# Patient Record
Sex: FEMALE | Race: ASIAN
[De-identification: names, ages, dates, MRNs, and addresses within clinical notes are randomized per-mention and may not be internally consistent; named-entity substitution may affect disease eponyms.]

---

## 2020-02-26 ENCOUNTER — HOSPITAL ENCOUNTER (EMERGENCY)
Dept: HOSPITAL 25 - ED | Age: 20
Discharge: HOME | End: 2020-02-26
Payer: COMMERCIAL

## 2020-02-26 DIAGNOSIS — Z88.0: ICD-10-CM

## 2020-02-26 DIAGNOSIS — N39.0: Primary | ICD-10-CM

## 2020-02-26 DIAGNOSIS — Z91.040: ICD-10-CM

## 2020-02-26 LAB
ALBUMIN SERPL BCG-MCNC: 4.3 G/DL (ref 3.2–5.2)
ALBUMIN/GLOB SERPL: 1.4 {RATIO} (ref 1–3)
ALP SERPL-CCNC: 47 U/L (ref 34–104)
ALT SERPL W P-5'-P-CCNC: 16 U/L (ref 7–52)
ANION GAP SERPL CALC-SCNC: 8 MMOL/L (ref 2–11)
AST SERPL-CCNC: 17 U/L (ref 13–39)
BASOPHILS # BLD AUTO: 0 10^3/UL (ref 0–0.2)
BUN SERPL-MCNC: 12 MG/DL (ref 6–24)
BUN/CREAT SERPL: 17.9 (ref 8–20)
CALCIUM SERPL-MCNC: 9.4 MG/DL (ref 8.6–10.3)
CHLORIDE SERPL-SCNC: 104 MMOL/L (ref 101–111)
EOSINOPHIL # BLD AUTO: 0.1 10^3/UL (ref 0–0.6)
GLOBULIN SER CALC-MCNC: 3 G/DL (ref 2–4)
GLUCOSE SERPL-MCNC: 107 MG/DL (ref 70–100)
HCG SERPL QL: < 0.6 MIU/ML
HCO3 SERPL-SCNC: 24 MMOL/L (ref 22–32)
HCT VFR BLD AUTO: 38 % (ref 35–47)
HGB BLD-MCNC: 13.2 G/DL (ref 12–16)
LYMPHOCYTES # BLD AUTO: 0.5 10^3/UL (ref 1–4.8)
MCH RBC QN AUTO: 31 PG (ref 27–31)
MCHC RBC AUTO-ENTMCNC: 35 G/DL (ref 31–36)
MCV RBC AUTO: 88 FL (ref 80–97)
MONOCYTES # BLD AUTO: 0.6 10^3/UL (ref 0–0.8)
NEUTROPHILS # BLD AUTO: 7.5 10^3/UL (ref 1.5–7.7)
NRBC # BLD AUTO: 0 10^3/UL
NRBC BLD QL AUTO: 0
PLATELET # BLD AUTO: 162 10^3/UL (ref 150–450)
POTASSIUM SERPL-SCNC: 3.6 MMOL/L (ref 3.5–5)
PROT SERPL-MCNC: 7.3 G/DL (ref 6.4–8.9)
RBC # BLD AUTO: 4.3 10^6 /UL (ref 3.7–4.87)
SODIUM SERPL-SCNC: 136 MMOL/L (ref 135–145)
VIT C UR QL: (no result)
WBC # BLD AUTO: 8.7 10^3/UL (ref 3.5–10.8)
WBC UR QL AUTO: (no result)

## 2020-02-26 PROCEDURE — 96365 THER/PROPH/DIAG IV INF INIT: CPT

## 2020-02-26 PROCEDURE — 85025 COMPLETE CBC W/AUTO DIFF WBC: CPT

## 2020-02-26 PROCEDURE — 96361 HYDRATE IV INFUSION ADD-ON: CPT

## 2020-02-26 PROCEDURE — 84702 CHORIONIC GONADOTROPIN TEST: CPT

## 2020-02-26 PROCEDURE — 36415 COLL VENOUS BLD VENIPUNCTURE: CPT

## 2020-02-26 PROCEDURE — 96375 TX/PRO/DX INJ NEW DRUG ADDON: CPT

## 2020-02-26 PROCEDURE — 99282 EMERGENCY DEPT VISIT SF MDM: CPT

## 2020-02-26 PROCEDURE — 87086 URINE CULTURE/COLONY COUNT: CPT

## 2020-02-26 PROCEDURE — 80053 COMPREHEN METABOLIC PANEL: CPT

## 2020-02-26 PROCEDURE — 81015 MICROSCOPIC EXAM OF URINE: CPT

## 2020-02-26 PROCEDURE — 81003 URINALYSIS AUTO W/O SCOPE: CPT

## 2020-02-26 NOTE — ED
GI/ HPI





- HPI Summary


HPI Summary: 


Patient is a 20 y/o F presenting to [facility] with complaints of back pain, N/V, 

fever, and UTI Sx. She states that she was recently diagnosed with a UTI and 

prescribed Macrobid. However, last evening, she experienced N/V. When she awoke 

in the morning, she states that she measured her temperature to be 101 F. 

Patient took Advil and her antibiotic. However, she states that she also 

experienced onset of back pain and came to ED for evaluation as she is 

concerned that she has a kidney infection. Decreased appetite is noted as well 

but diarrhea is denied. Hx of UTIs noted. Patient states that she is on birth 

control but no other daily medications. LNMP was 2/9/20. She claims allergy to 

Penicillin. No PSHx noted. She is adopted and does not know FMHx. She notes 

occasional alcohol usage but none recently. Vape, cigarette, and other 

substance usage denied. Home medications and allergies are reviewed. 














- History of Current Complaint


Chief Complaint: EDFlankPain


Time Seen by Provider: 02/26/20 21:25


Stated Complaint: LOWER BACK PAIN PER PT


Hx Obtained From: Patient


Onset/Duration: Still Present


Timing: Constant


Severity: Severe


Current Severity: Severe


Pain Intensity: 7


Location of Pain: Other - back pain


Associated Signs and Symptoms: Positive: Back Pain, Nausea, Vomiting, Fever, 

Change in Appetite - decreased, UTI Symptoms.  Negative: Diarrhea





- Allergy/Home Medications


Allergies/Adverse Reactions: 


 Allergies











Allergy/AdvReac Type Severity Reaction Status Date / Time


 


latex Allergy  Unknown Verified 02/26/20 19:12





   Reaction  





   Details  


 


Penicillins Allergy  Unknown Verified 02/26/20 19:12





   Reaction  





   Details  











Home Medications: 


 Home Medications





Cephalexin CAP* [Keflex CAP*] 500 mg PO TID #15 cap 02/26/20 [Rx]











PMH/Surg Hx/FS Hx/Imm Hx


 History: Reports: Other  Problems/Disorders - UTI 


Sensory History: 


   Denies: Hx Legally Blind, Hx Deafness


Opthamlomology History: 


   Denies: Hx Legally Blind


EENT History: 


   Denies: Hx Deafness





- Surgical History


Surgery Procedure, Year, and Place: none as of 2/26/20


Infectious Disease History: No


Infectious Disease History: 


   Denies: Traveled Outside the [place] in Last 30 Days





- Family History


Known Family History: Positive: Unknown - patient is adopted 





- Social History


Alcohol Use: Occasionally


Substance Use Type: Reports: None


Smoking Status (MU): Never Smoked Tobacco





- Additional Comments


History Additional Comments: 





PMHx of UTIs


no PSHx


adopted, unknown FMHx


SHx of occasional alcohol usage 





Review of Systems


Positive: Fever


Gastrointestinal: Other - positive - decreased appetite 


Positive: Vomiting, Nausea.  Negative: Diarrhea


Genitourinary: Other - positive - UTI Sx 


Positive: Myalgia - back pain 


All Other Systems Reviewed And Are Negative: Yes





Physical Exam





- Summary


Physical Exam Summary: 


General: Well-developed, Well-nourished female. No acute distress.


HEENT: Normocephalic, Atraumatic. 


              Eyes: Conjuctiva normal, PERRL.


              Oropharynx: Clear, mucous membranes moist, (-) exudates. 


Neck: Soft, FROM, (-) lymphadenopathy, (-) thyromegaly, (-) JVD.


Cardiovascular: Normal sinus rhythm, (-) murmur.


Lungs: Clear to auscultation bilaterally (-) wheezes, (-) rales, (-) rhonchi.


Abdomen: Soft, non-tender, non-distended, (-) organomegaly, normal bowel sounds.


Back: (+) bilateral CVA tenderness


Extremities: No edema.


Skin: Warm, dry, (-) rash.


Neuro: Alert and oriented x3, moves all extremities equally. No ataxia. No gait 

disturbance. No sensory deficit. Normal strength, normal sensation. 


Psychiatric: Mood normal, affect normal.





Triage Information Reviewed: Yes


Vital Signs On Initial Exam: 


 Initial Vitals











Temp Pulse Resp BP Pulse Ox


 


 98.8 F   104   18   116/71   96 


 


 02/26/20 19:09  02/26/20 19:09  02/26/20 19:09  02/26/20 19:09  02/26/20 19:09











Vital Signs Reviewed: Yes





Procedures





- Sedation


Patient Received Moderate/Deep Sedation with Procedure: No





Diagnostics





- Vital Signs


 Vital Signs











  Temp Pulse Resp BP Pulse Ox


 


 02/26/20 19:09  98.8 F  104  18  116/71  96














- Laboratory


Result Diagrams: 


 02/26/20 21:28





 02/26/20 21:28


Lab Statement: Any lab studies that have been ordered have been reviewed, and 

results considered in the medical decision making process.





GIGU Course/Dx





- Course


Course Of Treatment: 19-year-old female presents with flank pain.  She states 

she was diagnosed with UTI 2 days ago.  Started on Macrobid.  She has been 

taking as directed.  However she states she feels worse now with bilateral 

flank pain.  Continues with lower abdominal pain now after she urinates.  She 

felt feverish at home.  Some nausea.  No vomiting.  Ate little today.  On 

physical exam she is afebrile.  Mild bilateral flank tenderness.  No 

significant suprapubic tenderness.  Workup demonstrates a normal white count.  

Normal lactic acid. obvious urinary tract infection.  Patient given IV fluids, 

Toradol, Zofran,Rocephin. antibiotics switched to Keflex at this time.  Advised 

plenty of fluids.  Follow up with PCP.  Follow up sooner for any worsening 

symptoms.





- Diagnoses


Provider Diagnoses: 


 UTI (urinary tract infection)








Discharge ED





- Sign-Out/Discharge


Documenting (check all that apply): Patient Departure - discharge 





- Discharge Plan


Condition: Stable


Disposition: HOME


Prescriptions: 


Cephalexin CAP* [Keflex CAP*] 500 mg PO TID #15 cap


Patient Education Materials:  Urinary Tract Infection in Women (ED)


Referrals: 


[facility] [Provider Group] - 3 Days


Additional Instructions: 


PLEASE RETURN TO ED FOR ANY NEW OR WORSENING SYMPTOMS. PLEASE FOLLOW UP WITH 

YOUR PRIMARY CARE PHYSICIAN WITHIN THREE DAYS. 





- Billing Disposition and Condition


Condition: STABLE


Disposition: Home





- Attestation Statements


Document Initiated by Jeannette: Yes


Documenting Aspenibe: GEORGI TAYLOR


Provider For Whom Jeannette is Documenting (Include Credential): LEENA GARCIA MD


Scribe Attestation: 


GEORGI DE ANDA, scribed for LEENA GARCIA MD on 02/27/20 at 0312. 


Scribe Documentation Reviewed: Yes


Provider Attestation: 


The documentation as recorded by the GEORGI myers accurately reflects 

the service I personally performed and the decisions made by me, LEENA GARCIA MD


Status of Scribe Document: Viewed

## 2020-02-27 VITALS — SYSTOLIC BLOOD PRESSURE: 99 MMHG | DIASTOLIC BLOOD PRESSURE: 59 MMHG
